# Patient Record
Sex: FEMALE | Race: BLACK OR AFRICAN AMERICAN | NOT HISPANIC OR LATINO | URBAN - METROPOLITAN AREA
[De-identification: names, ages, dates, MRNs, and addresses within clinical notes are randomized per-mention and may not be internally consistent; named-entity substitution may affect disease eponyms.]

---

## 2021-03-31 DIAGNOSIS — Z23 ENCOUNTER FOR IMMUNIZATION: ICD-10-CM

## 2024-05-24 ENCOUNTER — OFFICE VISIT (OUTPATIENT)
Age: 49
End: 2024-05-24
Payer: COMMERCIAL

## 2024-05-24 VITALS
OXYGEN SATURATION: 99 % | DIASTOLIC BLOOD PRESSURE: 90 MMHG | SYSTOLIC BLOOD PRESSURE: 139 MMHG | WEIGHT: 158 LBS | RESPIRATION RATE: 18 BRPM | TEMPERATURE: 97.9 F | HEART RATE: 85 BPM

## 2024-05-24 DIAGNOSIS — J45.901 MILD ASTHMA WITH ACUTE EXACERBATION, UNSPECIFIED WHETHER PERSISTENT: Primary | ICD-10-CM

## 2024-05-24 PROCEDURE — G0382 LEV 3 HOSP TYPE B ED VISIT: HCPCS | Performed by: PHYSICIAN ASSISTANT

## 2024-05-24 PROCEDURE — S9083 URGENT CARE CENTER GLOBAL: HCPCS | Performed by: PHYSICIAN ASSISTANT

## 2024-05-24 RX ORDER — AMITRIPTYLINE HYDROCHLORIDE 10 MG/1
TABLET, FILM COATED ORAL
COMMUNITY

## 2024-05-24 RX ORDER — FLUOXETINE 10 MG/1
10 CAPSULE ORAL DAILY
COMMUNITY
Start: 2024-05-10

## 2024-05-24 RX ORDER — NORETHINDRONE ACETATE AND ETHINYL ESTRADIOL AND FERROUS FUMARATE 1.5-30(21)
KIT ORAL
COMMUNITY
Start: 2024-03-29

## 2024-05-24 RX ORDER — BIOTIN 5 MG
5 TABLET ORAL DAILY
COMMUNITY

## 2024-05-24 RX ORDER — OMEPRAZOLE 40 MG/1
CAPSULE, DELAYED RELEASE ORAL
COMMUNITY

## 2024-05-24 RX ORDER — MELOXICAM 15 MG/1
15 TABLET ORAL DAILY
COMMUNITY
Start: 2024-04-24

## 2024-05-24 RX ORDER — TRAMADOL HYDROCHLORIDE 50 MG/1
TABLET ORAL
COMMUNITY

## 2024-05-24 RX ORDER — PREDNISONE 10 MG/1
40 TABLET ORAL DAILY
Qty: 20 TABLET | Refills: 0 | Status: SHIPPED | OUTPATIENT
Start: 2024-05-24 | End: 2024-05-29

## 2024-05-24 RX ORDER — HYDROXYZINE HYDROCHLORIDE 25 MG/1
25 TABLET, FILM COATED ORAL EVERY 8 HOURS PRN
COMMUNITY
Start: 2024-05-10

## 2024-05-24 RX ORDER — AMLODIPINE BESYLATE 10 MG/1
10 TABLET ORAL DAILY
COMMUNITY
Start: 2023-11-06 | End: 2024-11-05

## 2024-05-24 RX ORDER — BENZONATATE 200 MG/1
200 CAPSULE ORAL 3 TIMES DAILY PRN
Qty: 20 CAPSULE | Refills: 0 | Status: SHIPPED | OUTPATIENT
Start: 2024-05-24

## 2024-05-24 NOTE — PROGRESS NOTES
[facility]        NAME: Shanthi Alva is a 48 y.o. female  : 1975    MRN: [id number]  DATE: May 24, 2024  TIME: 6:59 PM    Assessment and Plan   Mild asthma with acute exacerbation, unspecified whether persistent [J45.901]  1. Mild asthma with acute exacerbation, unspecified whether persistent  predniSONE 10 mg tablet    benzonatate (TESSALON) 200 MG capsule          Discussed with the patient need to follow-up PCP if no better, go to the ER if any worsening shortness of breath or chest pain    The patient verbalized understanding of exam findings and treatment plan.   We engaged in the shared decision-making process and treatment options were   discussed at length with the patient.  All questions, concerns and  complaints were answered and addressed to the patient's satisfaction.    Patient Instructions   There are no Patient Instructions on file for this visit.    Follow up with PCP in 3-5 days.  Proceed to  ER if symptoms worsen.    If tests are performed, our office will contact you with results only if   changes need to made to the care plan discussed with you at the visit.   You can review your full results on [facility].     Chief Complaint     Chief Complaint   Patient presents with   • Cough     Pt states she has a cough, chest congestion, on and off lightheadedness starting yesterday          History of Present Illness       HPI  Pt complains of cough , chest tightness/burning and congestion, occasional lightheadedness. Also has history of asthma, reports only using albuterol occasionally. No sinus pain, ear ache, sore throat.     Review of Systems   Review of Systems  All other related systems reviewed and are negative except as noted in HPI    Current Medications       Current Outpatient Medications:   •  amLODIPine (NORVASC) 10 mg tablet, Take 10 mg by mouth daily, Disp: , Rfl:   •  benzonatate (TESSALON) 200 MG capsule, Take 1 capsule (200 mg total) by mouth 3 (three) times a  day as needed for cough, Disp: 20 capsule, Rfl: 0  •  Biotin 5 MG TABS, Take 5 mg by mouth daily, Disp: , Rfl:   •  FLUoxetine (PROzac) 10 mg capsule, Take 10 mg by mouth daily, Disp: , Rfl:   •  Regi FE 1.5/30 1.5-30 MG-MCG tablet, TAKE 1 TABLET BY MOUTH EVERY DAY *SKIP PLACEBO FOR OVARIAN SUPPRESSION*, Disp: , Rfl:   •  hydrOXYzine HCL (ATARAX) 25 mg tablet, Take 25 mg by mouth every 8 (eight) hours as needed, Disp: , Rfl:   •  meloxicam (MOBIC) 15 mg tablet, Take 15 mg by mouth daily, Disp: , Rfl:   •  predniSONE 10 mg tablet, Take 4 tablets (40 mg total) by mouth daily for 5 days, Disp: 20 tablet, Rfl: 0  •  amitriptyline (ELAVIL) 10 mg tablet, Take by mouth (Patient not taking: Reported on 5/24/2024), Disp: , Rfl:   •  omeprazole (PriLOSEC) 40 MG capsule, Take by mouth (Patient not taking: Reported on 5/24/2024), Disp: , Rfl:   •  traMADol (ULTRAM) 50 mg tablet, Take by mouth (Patient not taking: Reported on 5/24/2024), Disp: , Rfl:     Current Allergies     Allergies as of 05/24/2024 - Reviewed 05/24/2024   Allergen Reaction Noted   • Pollen extract Other (See Comments) 10/04/2017            The following portions of the patient's history were reviewed and updated as appropriate: allergies, current medications, past family history, past medical history, past social history, past surgical history and problem list.     Past Medical History:   Diagnosis Date   • Asthma        Past Surgical History:   Procedure Laterality Date   • APPENDECTOMY         History reviewed. No pertinent family history.      Medications have been verified.        Objective   /90   Pulse 85   Temp 97.9 °F (36.6 °C)   Resp 18   Wt 71.7 kg (158 lb)   SpO2 99%   No LMP recorded.       Physical Exam     Physical Exam  Constitutional:       General: She is not in acute distress.     Appearance: She is well-developed. She is not diaphoretic.   HENT:      Head: Normocephalic and atraumatic.   Eyes:      General: No scleral  "icterus.     Conjunctiva/sclera: Conjunctivae normal.   Neck:      Trachea: No tracheal deviation.   Cardiovascular:      Rate and Rhythm: Normal rate and regular rhythm.      Heart sounds: Normal heart sounds. No murmur heard.  Pulmonary:      Effort: Pulmonary effort is normal. No respiratory distress.      Breath sounds: Normal breath sounds. No stridor. No wheezing, rhonchi or rales.   Musculoskeletal:      Cervical back: Normal range of motion and neck supple.   Lymphadenopathy:      Cervical: No cervical adenopathy.   Skin:     General: Skin is warm and dry.      Findings: No erythema.   Neurological:      Mental Status: She is alert and oriented to person, place, and time.   Psychiatric:         Behavior: Behavior normal.         Ortho Exam        Procedures  No Procedures performed today        Note: Portions of this record may have been created with voice recognition software. Occasional wrong word or \"sound a like\" substitutions may have occurred due to the inherent limitations of voice recognition software. Please read the chart carefully and recognize, using context, where substitutions have occurred.*      "

## 2024-08-04 ENCOUNTER — HOSPITAL ENCOUNTER (EMERGENCY)
Facility: HOSPITAL | Age: 49
Discharge: HOME | End: 2024-08-04
Attending: EMERGENCY MEDICINE
Payer: COMMERCIAL

## 2024-08-04 ENCOUNTER — APPOINTMENT (EMERGENCY)
Dept: CT IMAGING | Facility: HOSPITAL | Age: 49
End: 2024-08-04
Payer: COMMERCIAL

## 2024-08-04 ENCOUNTER — APPOINTMENT (EMERGENCY)
Dept: RADIOLOGY | Facility: HOSPITAL | Age: 49
End: 2024-08-04
Payer: COMMERCIAL

## 2024-08-04 VITALS
OXYGEN SATURATION: 99 % | BODY MASS INDEX: 24.26 KG/M2 | HEIGHT: 68 IN | SYSTOLIC BLOOD PRESSURE: 124 MMHG | HEART RATE: 97 BPM | TEMPERATURE: 97.7 F | DIASTOLIC BLOOD PRESSURE: 78 MMHG | WEIGHT: 160.05 LBS | RESPIRATION RATE: 20 BRPM

## 2024-08-04 DIAGNOSIS — R00.2 PALPITATIONS: ICD-10-CM

## 2024-08-04 DIAGNOSIS — M79.10 MYALGIA: ICD-10-CM

## 2024-08-04 DIAGNOSIS — M54.2 NECK PAIN: ICD-10-CM

## 2024-08-04 DIAGNOSIS — R53.83 FATIGUE: Primary | ICD-10-CM

## 2024-08-04 DIAGNOSIS — R51.9 HEADACHE: ICD-10-CM

## 2024-08-04 DIAGNOSIS — R07.9 CHEST PAIN: ICD-10-CM

## 2024-08-04 LAB
ALBUMIN SERPL BCG-MCNC: 4.4 G/DL (ref 3.5–5)
ALP SERPL-CCNC: 41 U/L (ref 34–104)
ALT SERPL W P-5'-P-CCNC: 16 U/L (ref 7–52)
ANION GAP SERPL CALCULATED.3IONS-SCNC: 8 MMOL/L (ref 4–13)
AST SERPL W P-5'-P-CCNC: 15 U/L (ref 13–39)
ATRIAL RATE: 105 BPM
BACTERIA UR QL AUTO: NORMAL /HPF
BASOPHILS # BLD AUTO: 0.05 THOUSANDS/ÂΜL (ref 0–0.1)
BASOPHILS NFR BLD AUTO: 1 % (ref 0–1)
BILIRUB SERPL-MCNC: 0.44 MG/DL (ref 0.2–1)
BILIRUB UR QL STRIP: NEGATIVE
BNP SERPL-MCNC: 9 PG/ML (ref 0–100)
BUN SERPL-MCNC: 15 MG/DL (ref 5–25)
CALCIUM SERPL-MCNC: 9.2 MG/DL (ref 8.4–10.2)
CARDIAC TROPONIN I PNL SERPL HS: <2 NG/L
CARDIAC TROPONIN I PNL SERPL HS: <2 NG/L
CHLORIDE SERPL-SCNC: 105 MMOL/L (ref 96–108)
CLARITY UR: CLEAR
CO2 SERPL-SCNC: 24 MMOL/L (ref 21–32)
COLOR UR: COLORLESS
CREAT SERPL-MCNC: 0.72 MG/DL (ref 0.6–1.3)
EOSINOPHIL # BLD AUTO: 0.09 THOUSAND/ÂΜL (ref 0–0.61)
EOSINOPHIL NFR BLD AUTO: 1 % (ref 0–6)
ERYTHROCYTE [DISTWIDTH] IN BLOOD BY AUTOMATED COUNT: 13 % (ref 11.6–15.1)
EXT PREGNANCY TEST URINE: NEGATIVE
EXT. CONTROL: NORMAL
GFR SERPL CREATININE-BSD FRML MDRD: 100 ML/MIN/1.73SQ M
GLUCOSE SERPL-MCNC: 92 MG/DL (ref 65–140)
GLUCOSE UR STRIP-MCNC: NEGATIVE MG/DL
HCG SERPL QL: NEGATIVE
HCT VFR BLD AUTO: 39.2 % (ref 34.8–46.1)
HGB BLD-MCNC: 13.4 G/DL (ref 11.5–15.4)
HGB UR QL STRIP.AUTO: ABNORMAL
IMM GRANULOCYTES # BLD AUTO: 0.03 THOUSAND/UL (ref 0–0.2)
IMM GRANULOCYTES NFR BLD AUTO: 0 % (ref 0–2)
KETONES UR STRIP-MCNC: NEGATIVE MG/DL
LEUKOCYTE ESTERASE UR QL STRIP: NEGATIVE
LIPASE SERPL-CCNC: 36 U/L (ref 11–82)
LYMPHOCYTES # BLD AUTO: 3.77 THOUSANDS/ÂΜL (ref 0.6–4.47)
LYMPHOCYTES NFR BLD AUTO: 40 % (ref 14–44)
MAGNESIUM SERPL-MCNC: 2.3 MG/DL (ref 1.9–2.7)
MCH RBC QN AUTO: 28.9 PG (ref 26.8–34.3)
MCHC RBC AUTO-ENTMCNC: 34.2 G/DL (ref 31.4–37.4)
MCV RBC AUTO: 85 FL (ref 82–98)
MONOCYTES # BLD AUTO: 0.73 THOUSAND/ÂΜL (ref 0.17–1.22)
MONOCYTES NFR BLD AUTO: 8 % (ref 4–12)
NEUTROPHILS # BLD AUTO: 4.68 THOUSANDS/ÂΜL (ref 1.85–7.62)
NEUTS SEG NFR BLD AUTO: 50 % (ref 43–75)
NITRITE UR QL STRIP: NEGATIVE
NON-SQ EPI CELLS URNS QL MICRO: NORMAL /HPF
NRBC BLD AUTO-RTO: 0 /100 WBCS
P AXIS: 80 DEGREES
PH UR STRIP.AUTO: 7.5 [PH]
PLATELET # BLD AUTO: 373 THOUSANDS/UL (ref 149–390)
PMV BLD AUTO: 9.6 FL (ref 8.9–12.7)
POTASSIUM SERPL-SCNC: 3.6 MMOL/L (ref 3.5–5.3)
PR INTERVAL: 126 MS
PROT SERPL-MCNC: 7.3 G/DL (ref 6.4–8.4)
PROT UR STRIP-MCNC: NEGATIVE MG/DL
QRS AXIS: 20 DEGREES
QRSD INTERVAL: 68 MS
QT INTERVAL: 306 MS
QTC INTERVAL: 404 MS
RBC # BLD AUTO: 4.63 MILLION/UL (ref 3.81–5.12)
RBC #/AREA URNS AUTO: NORMAL /HPF
SODIUM SERPL-SCNC: 137 MMOL/L (ref 135–147)
SP GR UR STRIP.AUTO: 1.01 (ref 1–1.03)
T WAVE AXIS: 48 DEGREES
TSH SERPL DL<=0.05 MIU/L-ACNC: 0.7 UIU/ML (ref 0.45–4.5)
UROBILINOGEN UR STRIP-ACNC: <2 MG/DL
VENTRICULAR RATE: 105 BPM
WBC # BLD AUTO: 9.35 THOUSAND/UL (ref 4.31–10.16)
WBC #/AREA URNS AUTO: NORMAL /HPF

## 2024-08-04 PROCEDURE — 80053 COMPREHEN METABOLIC PANEL: CPT

## 2024-08-04 PROCEDURE — 71046 X-RAY EXAM CHEST 2 VIEWS: CPT

## 2024-08-04 PROCEDURE — 99285 EMERGENCY DEPT VISIT HI MDM: CPT

## 2024-08-04 PROCEDURE — 83690 ASSAY OF LIPASE: CPT

## 2024-08-04 PROCEDURE — 72125 CT NECK SPINE W/O DYE: CPT

## 2024-08-04 PROCEDURE — 81025 URINE PREGNANCY TEST: CPT | Performed by: EMERGENCY MEDICINE

## 2024-08-04 PROCEDURE — 93005 ELECTROCARDIOGRAM TRACING: CPT

## 2024-08-04 PROCEDURE — 70496 CT ANGIOGRAPHY HEAD: CPT

## 2024-08-04 PROCEDURE — 83880 ASSAY OF NATRIURETIC PEPTIDE: CPT | Performed by: EMERGENCY MEDICINE

## 2024-08-04 PROCEDURE — 86618 LYME DISEASE ANTIBODY: CPT | Performed by: EMERGENCY MEDICINE

## 2024-08-04 PROCEDURE — 84484 ASSAY OF TROPONIN QUANT: CPT

## 2024-08-04 PROCEDURE — 99285 EMERGENCY DEPT VISIT HI MDM: CPT | Performed by: EMERGENCY MEDICINE

## 2024-08-04 PROCEDURE — 84443 ASSAY THYROID STIM HORMONE: CPT | Performed by: EMERGENCY MEDICINE

## 2024-08-04 PROCEDURE — 85025 COMPLETE CBC W/AUTO DIFF WBC: CPT

## 2024-08-04 PROCEDURE — 81001 URINALYSIS AUTO W/SCOPE: CPT | Performed by: EMERGENCY MEDICINE

## 2024-08-04 PROCEDURE — 83735 ASSAY OF MAGNESIUM: CPT | Performed by: EMERGENCY MEDICINE

## 2024-08-04 PROCEDURE — 70498 CT ANGIOGRAPHY NECK: CPT

## 2024-08-04 PROCEDURE — 84703 CHORIONIC GONADOTROPIN ASSAY: CPT | Performed by: EMERGENCY MEDICINE

## 2024-08-04 PROCEDURE — 36415 COLL VENOUS BLD VENIPUNCTURE: CPT

## 2024-08-04 PROCEDURE — 93010 ELECTROCARDIOGRAM REPORT: CPT | Performed by: STUDENT IN AN ORGANIZED HEALTH CARE EDUCATION/TRAINING PROGRAM

## 2024-08-04 RX ADMIN — IOHEXOL 85 ML: 350 INJECTION, SOLUTION INTRAVENOUS at 20:26

## 2024-08-05 LAB
ATRIAL RATE: 82 BPM
B BURGDOR IGG+IGM SER QL IA: NEGATIVE
P AXIS: 82 DEGREES
PR INTERVAL: 134 MS
QRS AXIS: 26 DEGREES
QRSD INTERVAL: 72 MS
QT INTERVAL: 356 MS
QTC INTERVAL: 415 MS
T WAVE AXIS: 59 DEGREES
VENTRICULAR RATE: 82 BPM

## 2024-08-05 NOTE — DISCHARGE INSTRUCTIONS
CT neck- No cervical spine fracture or traumatic malalignment.     Postoperative changes of ACDF at C4-C7. The surgical hardware appears intact.     If neck pain persists, a nonemergent MRI of the cervical spine could be performed for further evaluation.    Please follow up with your family doctor in regards to this

## 2024-08-05 NOTE — ED PROVIDER NOTES
"History  Chief Complaint   Patient presents with    Chest Pain     Pt c/o \"mid chest pain x 1 month. Tried muscle relaxers and naprosyn at home. Denies cardiachx. C/o intermittent cough, night sweats, and headache x1 month as well.\"     49 y/o female presents to the ED for multiple complaints. She states that she has had chest pain, fatigue, headache, neck pain, body aches, sweats, itching, and heart racing palpitations x months. States that she has been seen by multiple doctors and has had extensive workup and MRI head without any answers. She states that her fatigue and palpiations have been worsening over the last few days. Denies any n/t/w of her extremities. No other complaints.         History provided by:  Patient      Prior to Admission Medications   Prescriptions Last Dose Informant Patient Reported? Taking?   Biotin 5 MG TABS   Yes No   Sig: Take 5 mg by mouth daily   FLUoxetine (PROzac) 10 mg capsule   Yes No   Sig: Take 10 mg by mouth daily   Regi FE 1.5/30 1.5-30 MG-MCG tablet   Yes No   Sig: TAKE 1 TABLET BY MOUTH EVERY DAY *SKIP PLACEBO FOR OVARIAN SUPPRESSION*   amLODIPine (NORVASC) 10 mg tablet   Yes No   Sig: Take 10 mg by mouth daily   amitriptyline (ELAVIL) 10 mg tablet   Yes No   Sig: Take by mouth   Patient not taking: Reported on 5/24/2024   benzonatate (TESSALON) 200 MG capsule   No No   Sig: Take 1 capsule (200 mg total) by mouth 3 (three) times a day as needed for cough   hydrOXYzine HCL (ATARAX) 25 mg tablet   Yes No   Sig: Take 25 mg by mouth every 8 (eight) hours as needed   meloxicam (MOBIC) 15 mg tablet   Yes No   Sig: Take 15 mg by mouth daily   omeprazole (PriLOSEC) 40 MG capsule   Yes No   Sig: Take by mouth   Patient not taking: Reported on 5/24/2024   traMADol (ULTRAM) 50 mg tablet   Yes No   Sig: Take by mouth   Patient not taking: Reported on 5/24/2024      Facility-Administered Medications: None       Past Medical History:   Diagnosis Date    Asthma        Past Surgical " History:   Procedure Laterality Date    APPENDECTOMY      CERVICAL FUSION      2012       History reviewed. No pertinent family history.  I have reviewed and agree with the history as documented.    E-Cigarette/Vaping     E-Cigarette/Vaping Substances     Social History     Tobacco Use    Smoking status: Never    Smokeless tobacco: Never   Substance Use Topics    Alcohol use: Not Currently       Review of Systems   Constitutional:  Positive for fatigue. Negative for chills and fever.   HENT:  Negative for congestion, ear pain and sore throat.    Eyes:  Negative for pain and visual disturbance.   Respiratory:  Negative for cough, shortness of breath and wheezing.    Cardiovascular:  Positive for chest pain and palpitations. Negative for leg swelling.   Gastrointestinal:  Negative for abdominal pain, diarrhea, nausea and vomiting.   Genitourinary:  Negative for dysuria, frequency, hematuria and urgency.   Musculoskeletal:  Positive for myalgias. Negative for neck pain and neck stiffness.   Skin:  Negative for rash and wound.   Neurological:  Negative for weakness, numbness and headaches.   Psychiatric/Behavioral:  Negative for agitation and confusion.    All other systems reviewed and are negative.      Physical Exam  Physical Exam  Vitals and nursing note reviewed.   Constitutional:       Appearance: She is well-developed.   HENT:      Head: Normocephalic and atraumatic.   Eyes:      Pupils: Pupils are equal, round, and reactive to light.   Cardiovascular:      Rate and Rhythm: Normal rate and regular rhythm.   Pulmonary:      Effort: Pulmonary effort is normal.      Breath sounds: Normal breath sounds.   Abdominal:      General: Bowel sounds are normal.      Palpations: Abdomen is soft.   Musculoskeletal:         General: Normal range of motion.      Cervical back: Normal range of motion and neck supple.   Skin:     General: Skin is warm and dry.   Neurological:      General: No focal deficit present.      Mental  Status: She is alert and oriented to person, place, and time.      Comments: No focal deficits         Vital Signs  ED Triage Vitals   Temperature Pulse Respirations Blood Pressure SpO2   08/04/24 1907 08/04/24 1907 08/04/24 1907 08/04/24 1907 08/04/24 1907   97.7 °F (36.5 °C) (!) 112 18 138/82 98 %      Temp Source Heart Rate Source Patient Position - Orthostatic VS BP Location FiO2 (%)   08/04/24 1907 08/04/24 1907 08/04/24 1907 08/04/24 1907 --   Temporal Monitor Sitting Left arm       Pain Score       08/04/24 2225       3           Vitals:    08/04/24 1907 08/04/24 1930 08/04/24 2225   BP: 138/82 141/89 124/78   Pulse: (!) 112 (!) 117 97   Patient Position - Orthostatic VS: Sitting Lying Lying         Visual Acuity      ED Medications  Medications   iohexol (OMNIPAQUE) 350 MG/ML injection (MULTI-DOSE) 85 mL (85 mL Intravenous Given 8/4/24 2026)       Diagnostic Studies  Results Reviewed       Procedure Component Value Units Date/Time    HS Troponin I 2hr [[id number]] Collected: 08/04/24 2225    Lab Status: Final result Specimen: Blood from Arm, Left Updated: 08/04/24 2301     hs TnI 2hr <2 ng/L      Delta 2hr hsTnI --    TSH, 3rd generation with Free T4 reflex [[id number]]  (Normal) Collected: 08/04/24 1932    Lab Status: Final result Specimen: Blood from Arm, Right Updated: 08/04/24 2134     TSH 3RD GENERATON 0.705 uIU/mL     hCG, qualitative pregnancy [[id number]]  (Normal) Collected: 08/04/24 1932    Lab Status: Final result Specimen: Blood from Arm, Right Updated: 08/04/24 2126     Preg, Serum Negative    Magnesium [[id number]]  (Normal) Collected: 08/04/24 1932    Lab Status: Final result Specimen: Blood from Arm, Right Updated: 08/04/24 2126     Magnesium 2.3 mg/dL     B-Type Natriuretic Peptide(BNP) [[id number]]  (Normal) Collected: 08/04/24 1932    Lab Status: Final result Specimen: Blood from Arm, Right Updated: 08/04/24 2125     BNP 9 pg/mL     Lipase [[id number]]  (Normal) Collected: 08/04/24 1932    Lab  Status: Final result Specimen: Blood from Arm, Right Updated: 08/04/24 2042     Lipase 36 u/L     Urine Microscopic [[id number]]  (Normal) Collected: 08/04/24 2021    Lab Status: Final result Specimen: Urine, Clean Catch Updated: 08/04/24 2033     RBC, UA 1-2 /hpf      WBC, UA 1-2 /hpf      Epithelial Cells Occasional /hpf      Bacteria, UA None Seen /hpf     UA w Reflex to Microscopic w Reflex to Culture [[id number]]  (Abnormal) Collected: 08/04/24 2021    Lab Status: Final result Specimen: Urine, Clean Catch Updated: 08/04/24 2030     Color, UA Colorless     Clarity, UA Clear     Specific Gravity, UA 1.009     pH, UA 7.5     Leukocytes, UA Negative     Nitrite, UA Negative     Protein, UA Negative mg/dl      Glucose, UA Negative mg/dl      Ketones, UA Negative mg/dl      Urobilinogen, UA <2.0 mg/dl      Bilirubin, UA Negative     Occult Blood, UA Trace    Lyme Total AB W Reflex to IGM/IGG [[id number]] Collected: 08/04/24 2015    Lab Status: In process Specimen: Blood from Arm, Right Updated: 08/04/24 2024    Narrative:      The following orders were created for panel order Lyme Total AB W Reflex to IGM/IGG.  Procedure                               Abnormality         Status                     ---------                               -----------         ------                     Lyme Total AB W Reflex t...[[id number]]                      In process                   Please view results for these tests on the individual orders.    Lyme Total AB W Reflex to IGM/IGG [[id number]] Collected: 08/04/24 2015    Lab Status: In process Specimen: Blood from Arm, Right Updated: 08/04/24 2024    POCT pregnancy, urine [[id number]]  (Normal) Resulted: 08/04/24 2021    Lab Status: Final result Updated: 08/04/24 2022     EXT Preg Test, Ur Negative     Control Valid    HS Troponin I 4hr [[id number]]     Lab Status: No result Specimen: Blood     HS Troponin 0hr (reflex protocol) [[id number]]  (Normal) Collected: 08/04/24 1932    Lab  Status: Final result Specimen: Blood from Arm, Right Updated: 08/04/24 2001     hs TnI 0hr <2 ng/L     Comprehensive metabolic panel [[id number]] Collected: 08/04/24 1932    Lab Status: Final result Specimen: Blood from Arm, Right Updated: 08/04/24 1957     Sodium 137 mmol/L      Potassium 3.6 mmol/L      Chloride 105 mmol/L      CO2 24 mmol/L      ANION GAP 8 mmol/L      BUN 15 mg/dL      Creatinine 0.72 mg/dL      Glucose 92 mg/dL      Calcium 9.2 mg/dL      AST 15 U/L      ALT 16 U/L      Alkaline Phosphatase 41 U/L      Total Protein 7.3 g/dL      Albumin 4.4 g/dL      Total Bilirubin 0.44 mg/dL      eGFR 100 ml/min/1.73sq m     Narrative:      National Kidney Disease Foundation guidelines for Chronic Kidney Disease (CKD):     Stage 1 with normal or high GFR (GFR > 90 mL/min/1.73 square meters)    Stage 2 Mild CKD (GFR = 60-89 mL/min/1.73 square meters)    Stage 3A Moderate CKD (GFR = 45-59 mL/min/1.73 square meters)    Stage 3B Moderate CKD (GFR = 30-44 mL/min/1.73 square meters)    Stage 4 Severe CKD (GFR = 15-29 mL/min/1.73 square meters)    Stage 5 End Stage CKD (GFR <15 mL/min/1.73 square meters)  Note: GFR calculation is accurate only with a steady state creatinine    CBC and differential [[id number]] Collected: 08/04/24 1932    Lab Status: Final result Specimen: Blood from Arm, Right Updated: 08/04/24 1944     WBC 9.35 Thousand/uL      RBC 4.63 Million/uL      Hemoglobin 13.4 g/dL      Hematocrit 39.2 %      MCV 85 fL      MCH 28.9 pg      MCHC 34.2 g/dL      RDW 13.0 %      MPV 9.6 fL      Platelets 373 Thousands/uL      nRBC 0 /100 WBCs      Segmented % 50 %      Immature Grans % 0 %      Lymphocytes % 40 %      Monocytes % 8 %      Eosinophils Relative 1 %      Basophils Relative 1 %      Absolute Neutrophils 4.68 Thousands/µL      Absolute Immature Grans 0.03 Thousand/uL      Absolute Lymphocytes 3.77 Thousands/µL      Absolute Monocytes 0.73 Thousand/µL      Eosinophils Absolute 0.09 Thousand/µL       Basophils Absolute 0.05 Thousands/µL                    CTA head and neck with and without contrast   Final Result by Ivan Vaughan MD (08/04 2214)      CT Brain:  No acute intracranial abnormality.      CT Angiography:  Unremarkable CTA neck and brain.                  Workstation performed: [id number]         CT spine cervical without contrast   Final Result by Ivan Vaughan MD (08/04 2217)      No cervical spine fracture or traumatic malalignment.      Postoperative changes of ACDF at C4-C7. The surgical hardware appears intact.      If neck pain persists, a nonemergent MRI of the cervical spine could be performed for further evaluation.            Workstation performed: [id number]         XR chest 2 views   Final Result by Farrah Taylor MD (08/04 2000)      No acute cardiopulmonary disease.            Workstation performed: [id number]                    Procedures  ECG 12 Lead Documentation Only    Date/Time: 8/4/2024 11:18 PM    Performed by: Ladi Stubbs DO  Authorized by: Ladi Stubbs DO    Indications / Diagnosis:  Mutliple complaints  Patient location:  ED  Rate:     ECG rate:  82    ECG rate assessment: normal    Rhythm:     Rhythm: sinus rhythm    Ectopy:     Ectopy: none    QRS:     QRS axis:  Normal    QRS intervals:  Normal  ST segments:     ST segments:  Normal  T waves:     T waves: normal             ED Course  ED Course as of 08/04/24 2318   Sun Aug 04, 2024   1942 Heart racing palpiations, headache, fatigue, body aches, facial pain, headache, chest pain, night sweats, itching, had MRI done on 7/31              HEART Risk Score      Flowsheet Row Most Recent Value   Heart Score Risk Calculator    History 0 Filed at: 08/04/2024 2302   ECG 0 Filed at: 08/04/2024 2302   Age 1 Filed at: 08/04/2024 2302   Risk Factors 0 Filed at: 08/04/2024 2302   Troponin 0 Filed at: 08/04/2024 2302   HEART Score 1 Filed at: 08/04/2024 2302                          SBIRT 22yo+      Flowsheet  Row Most Recent Value   Initial Alcohol Screen: US AUDIT-C     1. How often do you have a drink containing alcohol? 0 Filed at: 08/04/2024 1912   2. How many drinks containing alcohol do you have on a typical day you are drinking?  0 Filed at: 08/04/2024 1912   3b. FEMALE Any Age, or MALE 65+: How often do you have 4 or more drinks on one occassion? 0 Filed at: 08/04/2024 1912   Audit-C Score 0 Filed at: 08/04/2024 1912   PRATEEK: How many times in the past year have you...    Used an illegal drug or used a prescription medication for non-medical reasons? Never Filed at: 08/04/2024 1912                      Medical Decision Making  47 y/o female with multiple complaints- will get cardiac workup, lyme, ct scan, and reassess.     Amount and/or Complexity of Data Reviewed  Labs: ordered.  Radiology: ordered.    Risk  Prescription drug management.                 Disposition  Final diagnoses:   Fatigue   Palpitations   Myalgia   Chest pain   Headache   Neck pain     Time reflects when diagnosis was documented in both MDM as applicable and the Disposition within this note       Time User Action Codes Description Comment    8/4/2024 11:02 PM Evelyne, Ladi A Add [R53.83] Fatigue     8/4/2024 11:02 PM Evelyne, Ladi A Add [R00.2] Palpitations     8/4/2024 11:02 PM Evelyne, Ladi A Add [M79.10] Myalgia     8/4/2024 11:02 PM Evelyne, Ladi A Add [R07.9] Chest pain     8/4/2024 11:02 PM Evelyne, Ladi A Add [R51.9] Headache     8/4/2024 11:02 PM Evelyne, Ladi A Add [M54.2] Neck pain           ED Disposition       ED Disposition   Discharge    Condition   Stable    Date/Time   Sun Aug 4, 2024 2245    Comment   Shanthi Alva discharge to home/self care.                   Follow-up Information       Follow up With Specialties Details Why Contact Info Additional Information    your family doctor  Call in 1 day for follow up within 2-3 days      [facility] Emergency Department Emergency Medicine Go to   immediately for any new or worsening symptoms [address] [facility] [id number] [phone] [facility], [address] [place], [place], [address]            Patient's Medications   Discharge Prescriptions    No medications on file       No discharge procedures on file.    PDMP Review       None            ED Provider  Electronically Signed by             Ladi Stubbs DO  08/04/24 0668

## 2024-08-06 ENCOUNTER — DOCUMENTATION (OUTPATIENT)
Dept: HEMATOLOGY ONCOLOGY | Facility: CLINIC | Age: 49
End: 2024-08-06

## 2024-08-06 NOTE — PROGRESS NOTES
Oncology Nurse Navigator  Intake received/ Chart reviewed for work up completed for self referral to hematology oncology.  Per recent ED note, presenting complaints include: chest pain, fatigue, headache, neck pain, body aches, sweats, itching, and heart racing palpitations       Referring physician:   SELF    Imaging completed:  CT  completed at [facility] and [facility]  [x] CT AP   [] PET   [x] Other head    Pathology completed:     All records needed are in patients chart. No records retrieval needed at this time.

## 2024-08-07 ENCOUNTER — PATIENT OUTREACH (OUTPATIENT)
Dept: HEMATOLOGY ONCOLOGY | Facility: CLINIC | Age: 49
End: 2024-08-07

## 2024-08-07 NOTE — PROGRESS NOTES
Outreach to pt to clarify referral to HemOnc for Lymphoma.  Introduced myself and role and stated I did not see a confirmed daignosis of Lymphoma.  Pt clarified that with some of the symptoms she has been having she wanted to get testing for lymphoma.  We discussed the best course of action would be to see her family doctor to discuss concerns and they will do some testing and refer to HemOnc if appropriate.  Explained that if the PCP runs tests or needs additional direction he/she can place an Ambuatory e-consult.  She verbalized understanding.  Informed her I would close the referral for now.

## 2024-11-09 ENCOUNTER — OFFICE VISIT (OUTPATIENT)
Age: 49
End: 2024-11-09
Payer: COMMERCIAL

## 2024-11-09 VITALS
WEIGHT: 166 LBS | SYSTOLIC BLOOD PRESSURE: 155 MMHG | DIASTOLIC BLOOD PRESSURE: 87 MMHG | RESPIRATION RATE: 18 BRPM | TEMPERATURE: 98.6 F | HEART RATE: 86 BPM | BODY MASS INDEX: 25.24 KG/M2

## 2024-11-09 DIAGNOSIS — R05.1 ACUTE COUGH: Primary | ICD-10-CM

## 2024-11-09 DIAGNOSIS — J06.9 ACUTE URI: ICD-10-CM

## 2024-11-09 PROCEDURE — 99213 OFFICE O/P EST LOW 20 MIN: CPT | Performed by: EMERGENCY MEDICINE

## 2024-11-09 RX ORDER — BROMPHENIRAMINE MALEATE, PSEUDOEPHEDRINE HYDROCHLORIDE, AND DEXTROMETHORPHAN HYDROBROMIDE 2; 30; 10 MG/5ML; MG/5ML; MG/5ML
5 SYRUP ORAL 4 TIMES DAILY PRN
Qty: 120 ML | Refills: 0 | Status: SHIPPED | OUTPATIENT
Start: 2024-11-09 | End: 2024-11-16

## 2024-11-09 NOTE — PROGRESS NOTES
[facility]        NAME: Shanthi Alva is a 49 y.o. female  : 1975    MRN: [id number]  DATE: 2024  TIME: 6:59 PM    Assessment and Plan   Acute cough [R05.1]  1. Acute cough  brompheniramine-pseudoephedrine-DM 30-2-10 MG/5ML syrup      2. Acute URI  brompheniramine-pseudoephedrine-DM 30-2-10 MG/5ML syrup        Rapid COVID was negative as well as her daughter's rapid Covid here in     Patient Instructions     Patient Instructions    Meds as instructed  F/u with PCP in 2-3 days  Hot tea/honey  Proceed to the ER if symptoms get worse      Follow up with PCP in 3-5 days.  Proceed to  ER if symptoms worsen.    Chief Complaint     Chief Complaint   Patient presents with    Cold Like Symptoms     Patient states she has had a cold since Tuesday, patient has a headache and has been coughing. Patient states she was recently dx'd with pericarditis and wants to make sure she doesn;t have an infection. Hx of asthma.         History of Present Illness       49-year-old black female with a chief complaint of a cough and  headache since Tuesday.  Patient was recently diagnosed with pericarditis and she just wants to make sure she does not have another infection.  Patient does have a history of asthma.        Review of Systems   Review of Systems   Constitutional:  Negative for chills and fever.   HENT:  Positive for congestion. Negative for ear pain and sore throat.    Eyes:  Negative for pain and visual disturbance.   Respiratory:  Positive for cough. Negative for shortness of breath.    Cardiovascular:  Negative for chest pain and palpitations.   Gastrointestinal:  Negative for abdominal pain and vomiting.   Genitourinary:  Negative for dysuria and hematuria.   Musculoskeletal:  Negative for arthralgias and back pain.   Skin:  Negative for color change and rash.   Neurological:  Positive for headaches. Negative for seizures and syncope.   All other systems reviewed and are negative.        Current  Medications       Current Outpatient Medications:     brompheniramine-pseudoephedrine-DM 30-2-10 MG/5ML syrup, Take 5 mL by mouth 4 (four) times a day as needed for cough or congestion for up to 7 days, Disp: 120 mL, Rfl: 0    amitriptyline (ELAVIL) 10 mg tablet, Take by mouth (Patient not taking: Reported on 5/24/2024), Disp: , Rfl:     amLODIPine (NORVASC) 10 mg tablet, Take 10 mg by mouth daily, Disp: , Rfl:     benzonatate (TESSALON) 200 MG capsule, Take 1 capsule (200 mg total) by mouth 3 (three) times a day as needed for cough, Disp: 20 capsule, Rfl: 0    Biotin 5 MG TABS, Take 5 mg by mouth daily, Disp: , Rfl:     FLUoxetine (PROzac) 10 mg capsule, Take 10 mg by mouth daily, Disp: , Rfl:     Regi FE 1.5/30 1.5-30 MG-MCG tablet, TAKE 1 TABLET BY MOUTH EVERY DAY *SKIP PLACEBO FOR OVARIAN SUPPRESSION*, Disp: , Rfl:     hydrOXYzine HCL (ATARAX) 25 mg tablet, Take 25 mg by mouth every 8 (eight) hours as needed, Disp: , Rfl:     meloxicam (MOBIC) 15 mg tablet, Take 15 mg by mouth daily, Disp: , Rfl:     omeprazole (PriLOSEC) 40 MG capsule, Take by mouth (Patient not taking: Reported on 5/24/2024), Disp: , Rfl:     traMADol (ULTRAM) 50 mg tablet, Take by mouth (Patient not taking: Reported on 5/24/2024), Disp: , Rfl:     Current Allergies     Allergies as of 11/09/2024 - Reviewed 11/09/2024   Allergen Reaction Noted    Pollen extract Other (See Comments) 10/04/2017            The following portions of the patient's history were reviewed and updated as appropriate: allergies, current medications, past family history, past medical history, past social history, past surgical history and problem list.     Past Medical History:   Diagnosis Date    Asthma        Past Surgical History:   Procedure Laterality Date    APPENDECTOMY      CERVICAL FUSION      2012       No family history on file.      Medications have been verified.        Objective   /87   Pulse 86   Temp 98.6 °F (37 °C)   Resp 18   Wt 75.3 kg (166  lb)   BMI 25.24 kg/m²        Physical Exam     Physical Exam  Vitals and nursing note reviewed.   Constitutional:       General: She is not in acute distress.     Appearance: She is well-developed. She is not ill-appearing, toxic-appearing or diaphoretic.      Comments: Very pleasant 49-year-old black female sitting on the exam table with a cough   HENT:      Head: Normocephalic and atraumatic.      Nose: Congestion present.      Mouth/Throat:      Pharynx: Oropharynx is clear.   Eyes:      General: No scleral icterus.     Extraocular Movements: Extraocular movements intact.      Pupils: Pupils are equal, round, and reactive to light.   Cardiovascular:      Rate and Rhythm: Normal rate and regular rhythm.      Heart sounds: Normal heart sounds.      Comments: I do not appreciate any murmurs or rubs  Pulmonary:      Effort: Pulmonary effort is normal. No respiratory distress.      Breath sounds: Normal breath sounds. No stridor. No wheezing, rhonchi or rales.   Chest:      Chest wall: No tenderness.   Abdominal:      General: Abdomen is flat. Bowel sounds are normal. There is no distension.      Palpations: Abdomen is soft. There is no shifting dullness, hepatomegaly, splenomegaly or mass.      Tenderness: There is no abdominal tenderness. There is no right CVA tenderness, left CVA tenderness or guarding. Negative signs include Burger's sign and McBurney's sign.      Hernia: No hernia is present.   Skin:     General: Skin is warm and dry.      Coloration: Skin is not cyanotic, jaundiced, mottled or pale.      Findings: No erythema.   Neurological:      General: No focal deficit present.      Mental Status: She is alert and oriented to person, place, and time.   Psychiatric:         Mood and Affect: Mood normal.

## 2025-04-05 ENCOUNTER — APPOINTMENT (EMERGENCY)
Dept: RADIOLOGY | Facility: HOSPITAL | Age: 50
End: 2025-04-05
Payer: COMMERCIAL

## 2025-04-05 ENCOUNTER — HOSPITAL ENCOUNTER (EMERGENCY)
Facility: HOSPITAL | Age: 50
Discharge: HOME | End: 2025-04-05
Attending: EMERGENCY MEDICINE
Payer: COMMERCIAL

## 2025-04-05 VITALS
TEMPERATURE: 97.8 F | WEIGHT: 155.87 LBS | DIASTOLIC BLOOD PRESSURE: 77 MMHG | HEART RATE: 90 BPM | SYSTOLIC BLOOD PRESSURE: 156 MMHG | OXYGEN SATURATION: 99 % | RESPIRATION RATE: 18 BRPM | BODY MASS INDEX: 23.7 KG/M2

## 2025-04-05 DIAGNOSIS — R07.9 CHEST PAIN: Primary | ICD-10-CM

## 2025-04-05 LAB
ALBUMIN SERPL BCG-MCNC: 4.3 G/DL (ref 3.5–5)
ALP SERPL-CCNC: 32 U/L (ref 34–104)
ALT SERPL W P-5'-P-CCNC: 18 U/L (ref 7–52)
ANION GAP SERPL CALCULATED.3IONS-SCNC: 8 MMOL/L (ref 4–13)
AST SERPL W P-5'-P-CCNC: 16 U/L (ref 13–39)
BASOPHILS # BLD AUTO: 0.05 THOUSANDS/ÂΜL (ref 0–0.1)
BASOPHILS NFR BLD AUTO: 1 % (ref 0–1)
BILIRUB SERPL-MCNC: 0.47 MG/DL (ref 0.2–1)
BUN SERPL-MCNC: 14 MG/DL (ref 5–25)
CALCIUM SERPL-MCNC: 8.6 MG/DL (ref 8.4–10.2)
CARDIAC TROPONIN I PNL SERPL HS: <2 NG/L (ref ?–50)
CHLORIDE SERPL-SCNC: 101 MMOL/L (ref 96–108)
CO2 SERPL-SCNC: 25 MMOL/L (ref 21–32)
CREAT SERPL-MCNC: 0.78 MG/DL (ref 0.6–1.3)
CRP SERPL QL: 2.8 MG/L
EOSINOPHIL # BLD AUTO: 0.08 THOUSAND/ÂΜL (ref 0–0.61)
EOSINOPHIL NFR BLD AUTO: 1 % (ref 0–6)
ERYTHROCYTE [DISTWIDTH] IN BLOOD BY AUTOMATED COUNT: 13.4 % (ref 11.6–15.1)
ERYTHROCYTE [SEDIMENTATION RATE] IN BLOOD: 6 MM/HOUR (ref 0–19)
GFR SERPL CREATININE-BSD FRML MDRD: 90 ML/MIN/1.73SQ M
GLUCOSE SERPL-MCNC: 101 MG/DL (ref 65–140)
HCT VFR BLD AUTO: 39.5 % (ref 34.8–46.1)
HGB BLD-MCNC: 12.6 G/DL (ref 11.5–15.4)
IMM GRANULOCYTES # BLD AUTO: 0.02 THOUSAND/UL (ref 0–0.2)
IMM GRANULOCYTES NFR BLD AUTO: 0 % (ref 0–2)
LYMPHOCYTES # BLD AUTO: 3.45 THOUSANDS/ÂΜL (ref 0.6–4.47)
LYMPHOCYTES NFR BLD AUTO: 40 % (ref 14–44)
MCH RBC QN AUTO: 27.2 PG (ref 26.8–34.3)
MCHC RBC AUTO-ENTMCNC: 31.9 G/DL (ref 31.4–37.4)
MCV RBC AUTO: 85 FL (ref 82–98)
MONOCYTES # BLD AUTO: 0.86 THOUSAND/ÂΜL (ref 0.17–1.22)
MONOCYTES NFR BLD AUTO: 10 % (ref 4–12)
NEUTROPHILS # BLD AUTO: 4.2 THOUSANDS/ÂΜL (ref 1.85–7.62)
NEUTS SEG NFR BLD AUTO: 48 % (ref 43–75)
NRBC BLD AUTO-RTO: 0 /100 WBCS
PLATELET # BLD AUTO: 355 THOUSANDS/UL (ref 149–390)
PMV BLD AUTO: 9.8 FL (ref 8.9–12.7)
POTASSIUM SERPL-SCNC: 3.6 MMOL/L (ref 3.5–5.3)
PROT SERPL-MCNC: 7.1 G/DL (ref 6.4–8.4)
RBC # BLD AUTO: 4.63 MILLION/UL (ref 3.81–5.12)
SODIUM SERPL-SCNC: 134 MMOL/L (ref 135–147)
WBC # BLD AUTO: 8.66 THOUSAND/UL (ref 4.31–10.16)

## 2025-04-05 PROCEDURE — 99284 EMERGENCY DEPT VISIT MOD MDM: CPT | Performed by: EMERGENCY MEDICINE

## 2025-04-05 PROCEDURE — 84484 ASSAY OF TROPONIN QUANT: CPT | Performed by: EMERGENCY MEDICINE

## 2025-04-05 PROCEDURE — 86140 C-REACTIVE PROTEIN: CPT | Performed by: EMERGENCY MEDICINE

## 2025-04-05 PROCEDURE — 85025 COMPLETE CBC W/AUTO DIFF WBC: CPT | Performed by: EMERGENCY MEDICINE

## 2025-04-05 PROCEDURE — 80053 COMPREHEN METABOLIC PANEL: CPT | Performed by: EMERGENCY MEDICINE

## 2025-04-05 PROCEDURE — 36415 COLL VENOUS BLD VENIPUNCTURE: CPT | Performed by: EMERGENCY MEDICINE

## 2025-04-05 PROCEDURE — 96374 THER/PROPH/DIAG INJ IV PUSH: CPT

## 2025-04-05 PROCEDURE — 93005 ELECTROCARDIOGRAM TRACING: CPT

## 2025-04-05 PROCEDURE — 85652 RBC SED RATE AUTOMATED: CPT | Performed by: EMERGENCY MEDICINE

## 2025-04-05 PROCEDURE — 71045 X-RAY EXAM CHEST 1 VIEW: CPT

## 2025-04-05 PROCEDURE — 99285 EMERGENCY DEPT VISIT HI MDM: CPT

## 2025-04-05 RX ORDER — KETOROLAC TROMETHAMINE 30 MG/ML
15 INJECTION, SOLUTION INTRAMUSCULAR; INTRAVENOUS ONCE
Status: COMPLETED | OUTPATIENT
Start: 2025-04-05 | End: 2025-04-05

## 2025-04-05 RX ORDER — IBUPROFEN 800 MG/1
800 TABLET, FILM COATED ORAL 3 TIMES DAILY
Qty: 21 TABLET | Refills: 0 | Status: SHIPPED | OUTPATIENT
Start: 2025-04-05

## 2025-04-05 RX ADMIN — KETOROLAC TROMETHAMINE 15 MG: 30 INJECTION, SOLUTION INTRAMUSCULAR at 22:28

## 2025-04-06 LAB
ATRIAL RATE: 81 BPM
P AXIS: 78 DEGREES
PR INTERVAL: 132 MS
QRS AXIS: 27 DEGREES
QRSD INTERVAL: 76 MS
QT INTERVAL: 374 MS
QTC INTERVAL: 434 MS
T WAVE AXIS: 57 DEGREES
VENTRICULAR RATE: 81 BPM

## 2025-04-06 PROCEDURE — 93010 ELECTROCARDIOGRAM REPORT: CPT | Performed by: INTERNAL MEDICINE

## 2025-04-13 NOTE — ED PROVIDER NOTES
"Time reflects when diagnosis was documented in both MDM as applicable and the Disposition within this note       Time User Action Codes Description Comment    4/5/2025 10:49 PM Christiano Luna Add [R07.9] Chest pain           ED Disposition       ED Disposition   Discharge    Condition   Stable    Date/Time   Sat Apr 5, 2025 10:49 PM    Comment   Shonwilfred Alva discharge to home/self care.                   Assessment & Plan       Medical Decision Making  49-year-old female with chest pain, history of pericarditis but no effusion on bedside echo here.  Will check cardiac labs, chest x-ray, serial troponins, EKG, reassess.    Amount and/or Complexity of Data Reviewed  Labs: ordered.  Radiology: ordered.    Risk  Prescription drug management.             Medications   ketorolac (TORADOL) injection 15 mg (15 mg Intravenous Given 4/5/25 2228)       ED Risk Strat Scores                    No data recorded        SBIRT 20yo+      Flowsheet Row Most Recent Value   Initial Alcohol Screen: US AUDIT-C     1. How often do you have a drink containing alcohol? 0 Filed at: 04/05/2025 2143   2. How many drinks containing alcohol do you have on a typical day you are drinking?  0 Filed at: 04/05/2025 2143   3b. FEMALE Any Age, or MALE 65+: How often do you have 4 or more drinks on one occassion? 0 Filed at: 04/05/2025 2143   Audit-C Score 0 Filed at: 04/05/2025 2143   PRATEEK: How many times in the past year have you...    Used an illegal drug or used a prescription medication for non-medical reasons? Never Filed at: 04/05/2025 2143                            History of Present Illness       Chief Complaint   Patient presents with    Chest Pain     Pt arrived ambulatory c/o cp x 2 weeks. Pt reports \"L sided chest tightness radiating R side as a throbbing pain\" onset last night. Hx pericarditis 9/2024       Past Medical History:   Diagnosis Date    Asthma       Past Surgical History:   Procedure Laterality Date    APPENDECTOMY      CERVICAL " FUSION      2012      History reviewed. No pertinent family history.   Social History     Tobacco Use    Smoking status: Never    Smokeless tobacco: Never   Substance Use Topics    Alcohol use: Not Currently    Drug use: Never      E-Cigarette/Vaping      E-Cigarette/Vaping Substances      I have reviewed and agree with the history as documented.     49-year-old female presented to the emergency department for evaluation of chest pain.  Patient's had 2 weeks of chest tightness mostly along the left side radiating from back to front, she does have history of pericarditis.  She has had no recent fevers chills or cough.  Pain is not exertional, nonpositional.        Review of Systems   Constitutional:  Negative for appetite change, chills, fatigue and fever.   HENT:  Negative for sneezing and sore throat.    Eyes:  Negative for visual disturbance.   Respiratory:  Negative for cough, choking, chest tightness, shortness of breath and wheezing.    Cardiovascular:  Positive for chest pain. Negative for palpitations.   Gastrointestinal:  Negative for abdominal pain, constipation, diarrhea, nausea and vomiting.   Genitourinary:  Negative for difficulty urinating and dysuria.   Neurological:  Negative for dizziness, weakness, light-headedness, numbness and headaches.   All other systems reviewed and are negative.          Objective       ED Triage Vitals   Temperature Pulse Blood Pressure Respirations SpO2 Patient Position - Orthostatic VS   04/05/25 2133 04/05/25 2133 04/05/25 2133 04/05/25 2133 04/05/25 2133 04/05/25 2133   97.8 °F (36.6 °C) 90 156/77 18 99 % Sitting      Temp Source Heart Rate Source BP Location FiO2 (%) Pain Score    04/05/25 2133 04/05/25 2133 04/05/25 2133 -- 04/05/25 2228    Temporal Monitor Left arm  4      Vitals      Date and Time Temp Pulse SpO2 Resp BP Pain Score FACES Pain Rating User   04/05/25 2228 -- -- -- -- -- 4 -- ML   04/05/25 2133 97.8 °F (36.6 °C) 90 99 % 18 156/77 -- -- MS             Physical Exam  Vitals and nursing note reviewed.   Constitutional:       General: She is not in acute distress.     Appearance: She is well-developed. She is not diaphoretic.   HENT:      Head: Normocephalic and atraumatic.   Eyes:      Pupils: Pupils are equal, round, and reactive to light.   Neck:      Vascular: No JVD.      Trachea: No tracheal deviation.   Cardiovascular:      Rate and Rhythm: Normal rate and regular rhythm.      Heart sounds: Normal heart sounds. No murmur heard.     No friction rub. No gallop.   Pulmonary:      Effort: Pulmonary effort is normal. No respiratory distress.      Breath sounds: Normal breath sounds. No wheezing or rales.   Abdominal:      General: Bowel sounds are normal. There is no distension.      Palpations: Abdomen is soft.      Tenderness: There is no abdominal tenderness. There is no guarding or rebound.   Skin:     General: Skin is warm and dry.      Coloration: Skin is not pale.   Neurological:      Mental Status: She is alert and oriented to person, place, and time.      Cranial Nerves: No cranial nerve deficit.      Motor: No abnormal muscle tone.   Psychiatric:         Behavior: Behavior normal.         Results Reviewed       Procedure Component Value Units Date/Time    C-reactive protein [[id number]]  (Normal) Collected: 04/05/25 2148    Lab Status: Final result Specimen: Blood from Arm, Right Updated: 04/05/25 2245     CRP 2.8 mg/L     Sedimentation rate, automated [[id number]]  (Normal) Collected: 04/05/25 2148    Lab Status: Final result Specimen: Blood from Arm, Right Updated: 04/05/25 2237     Sed Rate 6 mm/hour     HS Troponin 0hr (reflex protocol) [[id number]]  (Normal) Collected: 04/05/25 2148    Lab Status: Final result Specimen: Blood from Arm, Right Updated: 04/05/25 2221     hs TnI 0hr <2 ng/L     CBC and differential [[id number]] Collected: 04/05/25 2148    Lab Status: Final result Specimen: Blood from Arm, Right Updated: 04/05/25 2214     WBC 8.66  Thousand/uL      RBC 4.63 Million/uL      Hemoglobin 12.6 g/dL      Hematocrit 39.5 %      MCV 85 fL      MCH 27.2 pg      MCHC 31.9 g/dL      RDW 13.4 %      MPV 9.8 fL      Platelets 355 Thousands/uL      nRBC 0 /100 WBCs      Segmented % 48 %      Immature Grans % 0 %      Lymphocytes % 40 %      Monocytes % 10 %      Eosinophils Relative 1 %      Basophils Relative 1 %      Absolute Neutrophils 4.20 Thousands/µL      Absolute Immature Grans 0.02 Thousand/uL      Absolute Lymphocytes 3.45 Thousands/µL      Absolute Monocytes 0.86 Thousand/µL      Eosinophils Absolute 0.08 Thousand/µL      Basophils Absolute 0.05 Thousands/µL     Comprehensive metabolic panel [[id number]]  (Abnormal) Collected: 04/05/25 2148    Lab Status: Final result Specimen: Blood from Arm, Right Updated: 04/05/25 2213     Sodium 134 mmol/L      Potassium 3.6 mmol/L      Chloride 101 mmol/L      CO2 25 mmol/L      ANION GAP 8 mmol/L      BUN 14 mg/dL      Creatinine 0.78 mg/dL      Glucose 101 mg/dL      Calcium 8.6 mg/dL      AST 16 U/L      ALT 18 U/L      Alkaline Phosphatase 32 U/L      Total Protein 7.1 g/dL      Albumin 4.3 g/dL      Total Bilirubin 0.47 mg/dL      eGFR 90 ml/min/1.73sq m     Narrative:      [employer] guidelines for Chronic Kidney Disease (CKD):     Stage 1 with normal or high GFR (GFR > 90 mL/min/1.73 square meters)    Stage 2 Mild CKD (GFR = 60-89 mL/min/1.73 square meters)    Stage 3A Moderate CKD (GFR = 45-59 mL/min/1.73 square meters)    Stage 3B Moderate CKD (GFR = 30-44 mL/min/1.73 square meters)    Stage 4 Severe CKD (GFR = 15-29 mL/min/1.73 square meters)    Stage 5 End Stage CKD (GFR <15 mL/min/1.73 square meters)  Note: GFR calculation is accurate only with a steady state creatinine            XR chest 1 view portable   Final Interpretation by Farrah Taylor MD (04/06 0823)      No acute cardiopulmonary disease.            Workstation performed: [id number]              Procedures    ED Medication and Procedure Management   Prior to Admission Medications   Prescriptions Last Dose Informant Patient Reported? Taking?   Biotin 5 MG TABS   Yes No   Sig: Take 5 mg by mouth daily   FLUoxetine (PROzac) 10 mg capsule   Yes No   Sig: Take 10 mg by mouth daily   Regi AMOS 1.5/30 1.5-30 MG-MCG tablet   Yes No   Sig: TAKE 1 TABLET BY MOUTH EVERY DAY *SKIP PLACEBO FOR OVARIAN SUPPRESSION*   amLODIPine (NORVASC) 10 mg tablet   Yes No   Sig: Take 10 mg by mouth daily   amitriptyline (ELAVIL) 10 mg tablet   Yes No   Sig: Take by mouth   Patient not taking: Reported on 5/24/2024   benzonatate (TESSALON) 200 MG capsule   No No   Sig: Take 1 capsule (200 mg total) by mouth 3 (three) times a day as needed for cough   hydrOXYzine HCL (ATARAX) 25 mg tablet   Yes No   Sig: Take 25 mg by mouth every 8 (eight) hours as needed   meloxicam (MOBIC) 15 mg tablet   Yes No   Sig: Take 15 mg by mouth daily   omeprazole (PriLOSEC) 40 MG capsule   Yes No   Sig: Take by mouth   Patient not taking: Reported on 5/24/2024   traMADol (ULTRAM) 50 mg tablet   Yes No   Sig: Take by mouth   Patient not taking: Reported on 5/24/2024      Facility-Administered Medications: None     Discharge Medication List as of 4/5/2025 10:56 PM        START taking these medications    Details   ibuprofen (MOTRIN) 800 mg tablet Take 1 tablet (800 mg total) by mouth 3 (three) times a day, Starting Sat 4/5/2025, Normal           CONTINUE these medications which have NOT CHANGED    Details   amitriptyline (ELAVIL) 10 mg tablet Take by mouth, Historical Med      amLODIPine (NORVASC) 10 mg tablet Take 10 mg by mouth daily, Starting Mon 11/6/2023, Until Tue 11/5/2024, Historical Med      benzonatate (TESSALON) 200 MG capsule Take 1 capsule (200 mg total) by mouth 3 (three) times a day as needed for cough, Starting Fri 5/24/2024, Normal      Biotin 5 MG TABS Take 5 mg by mouth daily, Historical Med      FLUoxetine (PROzac) 10 mg capsule Take  10 mg by mouth daily, Starting Fri 5/10/2024, Historical Med      Regi FE 1.5/30 1.5-30 MG-MCG tablet TAKE 1 TABLET BY MOUTH EVERY DAY *SKIP PLACEBO FOR OVARIAN SUPPRESSION*, Historical Med      hydrOXYzine HCL (ATARAX) 25 mg tablet Take 25 mg by mouth every 8 (eight) hours as needed, Starting Fri 5/10/2024, Historical Med      meloxicam (MOBIC) 15 mg tablet Take 15 mg by mouth daily, Starting Wed 4/24/2024, Historical Med      omeprazole (PriLOSEC) 40 MG capsule Take by mouth, Historical Med      traMADol (ULTRAM) 50 mg tablet Take by mouth, Historical Med           No discharge procedures on file.  ED SEPSIS DOCUMENTATION   Time reflects when diagnosis was documented in both MDM as applicable and the Disposition within this note       Time User Action Codes Description Comment    4/5/2025 10:49 PM Christiano Luna Add [R07.9] Chest pain                  Christiano Luna MD  04/12/25 0874

## 2025-08-20 ENCOUNTER — CONSULT (OUTPATIENT)
Age: 50
End: 2025-08-20
Payer: COMMERCIAL

## 2025-08-20 VITALS
HEIGHT: 68 IN | SYSTOLIC BLOOD PRESSURE: 122 MMHG | DIASTOLIC BLOOD PRESSURE: 80 MMHG | BODY MASS INDEX: 23.64 KG/M2 | OXYGEN SATURATION: 96 % | HEART RATE: 92 BPM | WEIGHT: 156 LBS

## 2025-08-20 DIAGNOSIS — K59.00 CONSTIPATION, UNSPECIFIED CONSTIPATION TYPE: ICD-10-CM

## 2025-08-20 DIAGNOSIS — R10.9 ABDOMINAL CRAMPING: ICD-10-CM

## 2025-08-20 DIAGNOSIS — E61.1 IRON DEFICIENCY: Primary | ICD-10-CM

## 2025-08-20 PROCEDURE — 99204 OFFICE O/P NEW MOD 45 MIN: CPT | Performed by: PHYSICIAN ASSISTANT

## 2025-08-20 RX ORDER — BUSPIRONE HYDROCHLORIDE 5 MG/1
5 TABLET ORAL
COMMUNITY
Start: 2024-10-08 | End: 2025-10-08

## 2025-08-20 RX ORDER — PANTOPRAZOLE SODIUM 40 MG/1
40 TABLET, DELAYED RELEASE ORAL DAILY
COMMUNITY
Start: 2025-01-22 | End: 2026-01-22

## 2025-08-20 RX ORDER — DULOXETIN HYDROCHLORIDE 60 MG/1
CAPSULE, DELAYED RELEASE ORAL
COMMUNITY
Start: 2025-08-11

## 2025-08-20 RX ORDER — SULFASALAZINE 500 MG/1
TABLET, DELAYED RELEASE ORAL
COMMUNITY
Start: 2025-07-23 | End: 2025-08-20

## 2025-08-20 RX ORDER — COLCHICINE 0.6 MG/1
0.6 TABLET ORAL DAILY
COMMUNITY
Start: 2024-12-12

## 2025-08-20 RX ORDER — VALSARTAN AND HYDROCHLOROTHIAZIDE 320; 12.5 MG/1; MG/1
1 TABLET, FILM COATED ORAL DAILY
COMMUNITY
Start: 2025-03-31

## 2025-08-20 RX ORDER — SODIUM CHLORIDE, SODIUM LACTATE, POTASSIUM CHLORIDE, CALCIUM CHLORIDE 600; 310; 30; 20 MG/100ML; MG/100ML; MG/100ML; MG/100ML
125 INJECTION, SOLUTION INTRAVENOUS CONTINUOUS
OUTPATIENT
Start: 2025-08-20

## 2025-08-20 RX ORDER — HYDROXYCHLOROQUINE SULFATE 200 MG/1
200 TABLET, FILM COATED ORAL
COMMUNITY

## 2025-08-20 RX ORDER — LANOLIN ALCOHOL/MO/W.PET/CERES
400 CREAM (GRAM) TOPICAL DAILY
COMMUNITY
Start: 2024-11-19 | End: 2025-11-19

## 2025-08-20 RX ORDER — METOPROLOL TARTRATE 25 MG/1
25 TABLET, FILM COATED ORAL
COMMUNITY
Start: 2024-10-15 | End: 2025-10-15